# Patient Record
Sex: FEMALE | Race: WHITE
[De-identification: names, ages, dates, MRNs, and addresses within clinical notes are randomized per-mention and may not be internally consistent; named-entity substitution may affect disease eponyms.]

---

## 2018-03-26 ENCOUNTER — HOSPITAL ENCOUNTER (EMERGENCY)
Dept: HOSPITAL 31 - C.ER | Age: 18
Discharge: HOME | End: 2018-03-26
Payer: MEDICAID

## 2018-03-26 VITALS
TEMPERATURE: 97.7 F | SYSTOLIC BLOOD PRESSURE: 145 MMHG | DIASTOLIC BLOOD PRESSURE: 83 MMHG | OXYGEN SATURATION: 100 % | RESPIRATION RATE: 20 BRPM | HEART RATE: 109 BPM

## 2018-03-26 DIAGNOSIS — S30.0XXA: Primary | ICD-10-CM

## 2018-03-26 DIAGNOSIS — M54.5: ICD-10-CM
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
## 2018-03-26 NOTE — C.PDOC
History Of Present Illness


17 year old female presents to the ER with a complaint of right lower back pain 

that began after she slipped on 1 step and fell on ice 2 days ago, landing on 

her lower back. Patient states the pain has been getting worse since she is 

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 Patient took 

tylenol for her pain yesterday but nothing today. Denies weakness, numbness, 

dysuria, hematuria, or incontinence.


Time Seen by Provider: 03/26/18 22:19


Chief Complaint (Nursing): Back Pain


History Per: Patient


History/Exam Limitations: no limitations


Onset/Duration Of Symptoms: Days


Current Symptoms Are (Timing): Still Present


Quality Of Discomfort: Unable To Describe


Previous Symptoms: None


Associated Symptoms: None


Recent travel outside of the [place]: No





Past Medical History


Reviewed: Historical Data, Nursing Documentation, Vital Signs


Vital Signs: 


 Last Vital Signs











Temp  97.7 F   03/26/18 21:59


 


Pulse  109 H  03/26/18 21:59


 


Resp  20   03/26/18 21:59


 


BP  145/83   03/26/18 21:59


 


Pulse Ox  100   03/26/18 23:51














- Medical History


PMH: Asthma, Bronchitis


Family History: States: Unknown Family Hx





- Social History


Hx Tobacco Use: No


Hx Alcohol Use: No


Hx Substance Use: No





- Immunization History


Hx Tetanus Toxoid Vaccination: Yes


Hx Influenza Vaccination: No


Hx Pneumococcal Vaccination: No





Review Of Systems


Genitourinary: Negative for: Dysuria, Incontinence, Hematuria


Musculoskeletal: Positive for: Back Pain


Neurological: Negative for: Weakness, Numbness





Physical Exam





- Physical Exam


Appears: Non-toxic, No Acute Distress


Skin: Normal Color, Warm, Dry


Head: Atraumatic, Normacephalic


Eye(s): bilateral: Normal Inspection


Back: No Vertebral Tenderness, Paraspinal Tenderness (Right lumbar), No Other (

Ecchymosis)


Extremity: Normal ROM (x4)


Neurological/Psych: Oriented x3, Normal Speech, Normal Motor, Normal Sensation


Gait: Steady





ED Course And Treatment


O2 Sat by Pulse Oximetry: 100 (Room air)


Pulse Ox Interpretation: Normal


Progress Note: Motrin and flexeril administered. Patient is ambulatory in the 

ER with no pain or discomfort. Will discharge home with instructions to follow 

up with PMD or return if symptoms worsen.





Disposition


Counseled Patient/Family Regarding: Diagnosis, Need For Followup, Rx Given





- Disposition


Referrals: 


Geraldo Rosa MD [Staff Provider] - 


Disposition: HOME/ ROUTINE


Disposition Time: 22:43


Condition: STABLE


Additional Instructions: 


Take medications as directed





Follow up with PMD





Return to ER if worse 


Prescriptions: 


Cyclobenzaprine [Cyclobenzaprine HCl] 10 mg PO HS #10 tab


Ibuprofen [Motrin] 600 mg PO Q6H #24 tab


Instructions:  Low Back Pain in Adults


Forms:  [facility] (English), Work Excuse





- Clinical Impression


Clinical Impression: 


 Low back pain, Contusion of back








- PA / NP / Resident Statement


MD/DO has reviewed & agrees with the documentation as recorded.





- Scribe Statement


The provider has reviewed the documentation as recorded by the Scribe





Zhang Modi





All medical record entries made by the Shelliibclara were at my direction and 

personally dictated by me. I have reviewed the chart and agree that the record 

accurately reflects my personal performance of the history, physical exam, 

medical decision making, and the department course for this patient. I have 

also personally directed, reviewed, and agree with the discharge instructions 

and disposition.

## 2018-10-03 ENCOUNTER — HOSPITAL ENCOUNTER (EMERGENCY)
Dept: HOSPITAL 31 - C.ER | Age: 18
LOS: 1 days | Discharge: HOME | End: 2018-10-04
Payer: SELF-PAY

## 2018-10-03 VITALS — OXYGEN SATURATION: 98 % | TEMPERATURE: 99 F
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
## 2018-10-03 DIAGNOSIS — S93.402A: Primary | ICD-10-CM
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
## 2018-10-03 NOTE — C.PDOC
History Of Present Illness


18 year old female presents to the ED c/o left ankle pain and swelling. Patient 

reports she was walking to her car when she twisted her ankle 2 hours PTA. 

Patient states she put some ice before coming to the ED and the swelling went 

down. Patient denies weakness, numbness, headache, neck pain, other injury. 


Time Seen by Provider: 10/03/18 22:20


Chief Complaint (Nursing): Lower Extremity Problem/Injury


History Per: Patient


History/Exam Limitations: no limitations


Onset/Duration Of Symptoms: Hrs


Current Symptoms Are (Timing): Still Present


Recent travel outside of the [place]: No





- Ankle/Foot


Description Of Injury: Twisted


Alleviating Factor(s): Ice Therapy, Elevation





Past Medical History


Reviewed: Historical Data, Nursing Documentation, Vital Signs


Vital Signs: 





                                Last Vital Signs











Temp  99 F   10/03/18 22:22


 


Pulse  114 H  10/03/18 22:22


 


Resp  20   10/03/18 22:22


 


BP  147/89   10/03/18 22:22


 


Pulse Ox  98   10/03/18 22:22














- Medical History


PMH: Asthma, Bronchitis


Surgical History: No Surg Hx


Family History: States: Unknown Family Hx





- Social History


Hx Tobacco Use: No


Hx Alcohol Use: No


Hx Substance Use: No





- Immunization History


Hx Tetanus Toxoid Vaccination: No


Hx Influenza Vaccination: No


Hx Pneumococcal Vaccination: No





Review Of Systems


Constitutional: Negative for: Fever, Chills


Eyes: Negative for: Vision Change


Cardiovascular: Negative for: Chest Pain


Respiratory: Negative for: Cough


Gastrointestinal: Negative for: Nausea, Vomiting


Musculoskeletal: Positive for: Foot Pain


Skin: Negative for: Rash


Neurological: Negative for: Weakness, Numbness





Physical Exam





- Physical Exam


Appears: Non-toxic, No Acute Distress


Skin: Normal Color, Warm, Dry, No Rash, No Ecchymosis


Head: Atraumatic, Normacephalic


Eye(s): bilateral: Normal Inspection


Oral Mucosa: Moist


Extremity: Normal ROM, Tenderness (left ankle ), Capillary Refill (< 2 seconds),

No Deformity, Swelling (left ankle)


Pulses: Left Dorsalis Pedis: Normal, Right Dorsalis Pedis: Normal


Neurological/Psych: Oriented x3, Normal Speech, Normal Motor, Normal Sensation


Gait: Steady





ED Course And Treatment


O2 Sat by Pulse Oximetry: 98 (ON RA)


Pulse Ox Interpretation: Normal





Medical Decision Making


Medical Decision Making: 


Plan:


* Tylenol 659 mg PO


* Left foot X-Ray


* Left ankle X-Ray





Xrays are negative for fracture or dislocation. 


Aircast and crutches placed by ED tech and checked by me. 





Disposition





- Disposition


Referrals: 


Marquis Wallace MD [Staff Provider] - 


[facility] [Outside]


Disposition: HOME/ ROUTINE


Disposition Time: 23:58


Condition: GOOD


Additional Instructions: 


Follow up with the medical doctor within 1-2 days without fail. return if 

worsened. 


Prescriptions: 


Ibuprofen [Motrin] 600 mg PO TID #21 tab


Instructions:  Ankle Sprain


Forms:  [employer] (English), Work Excuse





- Clinical Impression


Clinical Impression: 


 Ankle sprain








- PA / NP / Resident Statement


MD/DO has reviewed & agrees with the documentation as recorded.





- Scribe Statement


The provider has reviewed the documentation as recorded by the Scribe


Sunil Carter





All medical record entries made by the Scribe were at my direction and 

personally dictated by me. I have reviewed the chart and agree that the record 

accurately reflects my personal performance of the history, physical exam, 

medical decision making, and the department course for this patient. I have also

 personally directed, reviewed, and agree with the discharge instructions and 

disposition.

## 2018-10-04 VITALS — DIASTOLIC BLOOD PRESSURE: 72 MMHG | HEART RATE: 102 BPM | SYSTOLIC BLOOD PRESSURE: 119 MMHG | RESPIRATION RATE: 18 BRPM

## 2018-10-04 NOTE — RAD
Date of service: 



10/03/2018



PROCEDURE:  Left Ankle Radiographs.



HISTORY:

 ankle injury 



COMPARISON:

None



FINDINGS:



BONES:

Normal. No fracture. 



JOINTS:

Normal. No osteoarthritis. Ankle mortise maintained. Talar dome intact



SOFT TISSUES:

Lateral perimalleolar soft tissue swelling present 



OTHER FINDINGS:

None.



IMPRESSION:

No fracture or dislocation is suggested. Mild soft tissue swelling in 

the area of interest is noted.

## 2018-10-04 NOTE — RAD
Date of service: 



10/03/2018



PROCEDURE:  Left Foot Radiographs.



HISTORY:

 pain and injury r/o fx 



COMPARISON:

None.



FINDINGS:



BONES:

Normal. No fracture. 



JOINTS:

Normal. 



SOFT TISSUES:

Normal. 



OTHER FINDINGS:

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 



IMPRESSION:

No fracture or dislocation